# Patient Record
Sex: FEMALE | HISPANIC OR LATINO
[De-identification: names, ages, dates, MRNs, and addresses within clinical notes are randomized per-mention and may not be internally consistent; named-entity substitution may affect disease eponyms.]

---

## 2023-07-10 ENCOUNTER — APPOINTMENT (OUTPATIENT)
Dept: FAMILY MEDICINE | Facility: CLINIC | Age: 73
End: 2023-07-10
Payer: MEDICARE

## 2023-07-10 ENCOUNTER — NON-APPOINTMENT (OUTPATIENT)
Age: 73
End: 2023-07-10

## 2023-07-10 VITALS
BODY MASS INDEX: 24.94 KG/M2 | RESPIRATION RATE: 16 BRPM | DIASTOLIC BLOOD PRESSURE: 70 MMHG | WEIGHT: 127 LBS | HEART RATE: 62 BPM | SYSTOLIC BLOOD PRESSURE: 138 MMHG | HEIGHT: 60 IN

## 2023-07-10 DIAGNOSIS — Z12.11 ENCOUNTER FOR SCREENING FOR MALIGNANT NEOPLASM OF COLON: ICD-10-CM

## 2023-07-10 DIAGNOSIS — Z12.31 ENCOUNTER FOR SCREENING MAMMOGRAM FOR MALIGNANT NEOPLASM OF BREAST: ICD-10-CM

## 2023-07-10 DIAGNOSIS — Z11.4 ENCOUNTER FOR SCREENING FOR HUMAN IMMUNODEFICIENCY VIRUS [HIV]: ICD-10-CM

## 2023-07-10 DIAGNOSIS — F41.9 ANXIETY DISORDER, UNSPECIFIED: ICD-10-CM

## 2023-07-10 DIAGNOSIS — F32.A ANXIETY DISORDER, UNSPECIFIED: ICD-10-CM

## 2023-07-10 DIAGNOSIS — Z11.59 ENCOUNTER FOR SCREENING FOR OTHER VIRAL DISEASES: ICD-10-CM

## 2023-07-10 PROCEDURE — 99204 OFFICE O/P NEW MOD 45 MIN: CPT | Mod: 25

## 2023-07-10 PROCEDURE — 36415 COLL VENOUS BLD VENIPUNCTURE: CPT

## 2023-07-10 RX ORDER — BISOPROLOL FUMARATE 5 MG/1
TABLET, FILM COATED ORAL
Refills: 0 | Status: DISCONTINUED | COMMUNITY
End: 2023-07-10

## 2023-07-10 RX ORDER — BISOPROLOL FUMARATE 5 MG/1
5 TABLET, FILM COATED ORAL
Qty: 45 | Refills: 1 | Status: ACTIVE | COMMUNITY
Start: 2023-07-10 | End: 1900-01-01

## 2023-07-10 RX ORDER — ESCITALOPRAM OXALATE 10 MG/1
10 TABLET ORAL
Refills: 0 | Status: DISCONTINUED | COMMUNITY
End: 2023-07-10

## 2023-07-10 NOTE — PLAN
[FreeTextEntry1] : \par Anxiety, depression\par Feels well off Escitalopram now, will continue to hold\par Not interested in therapist at this time\par \par In regards to hypertension\par Patient's blood pressure in adequate range. \par Continue Bisoprolol 2.5 mg  QD (1/2 of 5 mg pills QD)\par DIscussed avoid using salt, monitoring his blood pressures at home and bring the log on next visit.\par Advised to seek medical help emergently if systolic blood pressures (bottom number) is > 180 or < 100 or diastolic blood pressure (bottom number) is >100 or <60 or if patient has  symptoms that include but are not limited to constant headaches, chest pain, palpitations, dizziness, lightheadedness.\par \par Ordering preventive lab work\par Referring to GI for colonoscopy per patient's request\par \par Return to care: within 1 month for AWE or sooner should the need arise\par Call or return for any questions \par

## 2023-07-10 NOTE — HEALTH RISK ASSESSMENT
[No] : In the past 12 months have you used drugs other than those required for medical reasons? No [0] : 2) Feeling down, depressed, or hopeless: Not at all (0) [PHQ-2 Negative - No further assessment needed] : PHQ-2 Negative - No further assessment needed [HIV Test offered] : HIV Test offered [Hepatitis C test offered] : Hepatitis C test offered [Fully functional (bathing, dressing, toileting, transferring, walking, feeding)] : Fully functional (bathing, dressing, toileting, transferring, walking, feeding) [Fully functional (using the telephone, shopping, preparing meals, housekeeping, doing laundry, using] : Fully functional and needs no help or supervision to perform IADLs (using the telephone, shopping, preparing meals, housekeeping, doing laundry, using transportation, managing medications and managing finances) [Never] : Never [[id number]] : 0

## 2023-07-10 NOTE — HISTORY OF PRESENT ILLNESS
[FreeTextEntry1] : Establish care [de-identified] : Ms. JARAD FAROOQ is a pleasant 72 year old female with PMH of anxiety/depression, HTN who comes in to the office accompanied by her granddaughter Elzbieta to establish care. She has been out of her citalopram for about 1 month. She feels mildly anxious but no depression, suicidal thoughts. No other complaints \par Her   2 years ago due to COVID-19.\par \par Previous PCP:\par

## 2023-07-12 LAB
ALBUMIN SERPL ELPH-MCNC: 4.1 G/DL
ALP BLD-CCNC: 136 U/L
ALT SERPL-CCNC: 17 U/L
ANION GAP SERPL CALC-SCNC: 11 MMOL/L
AST SERPL-CCNC: 17 U/L
BILIRUB SERPL-MCNC: 0.4 MG/DL
BUN SERPL-MCNC: 20 MG/DL
CALCIUM SERPL-MCNC: 9.5 MG/DL
CHLORIDE SERPL-SCNC: 102 MMOL/L
CO2 SERPL-SCNC: 25 MMOL/L
CREAT SERPL-MCNC: 0.56 MG/DL
EGFR: 96 ML/MIN/1.73M2
GLUCOSE SERPL-MCNC: 94 MG/DL
HCV AB SER QL: NONREACTIVE
HCV S/CO RATIO: 0.24 S/CO
HIV1+2 AB SPEC QL IA.RAPID: NONREACTIVE
POTASSIUM SERPL-SCNC: 4.6 MMOL/L
PROT SERPL-MCNC: 7.8 G/DL
SODIUM SERPL-SCNC: 138 MMOL/L

## 2023-07-13 ENCOUNTER — NON-APPOINTMENT (OUTPATIENT)
Age: 73
End: 2023-07-13

## 2023-07-14 RX ORDER — POLYETHYLENE GLYCOL-3350 AND ELECTROLYTES WITH FLAVOR PACK 240; 5.84; 2.98; 6.72; 22.72 G/278.26G; G/278.26G; G/278.26G; G/278.26G; G/278.26G
240 POWDER, FOR SOLUTION ORAL
Qty: 1 | Refills: 0 | Status: ACTIVE | COMMUNITY
Start: 2023-07-14 | End: 1900-01-01

## 2023-07-25 ENCOUNTER — RESULT REVIEW (OUTPATIENT)
Age: 73
End: 2023-07-25

## 2023-07-25 ENCOUNTER — OUTPATIENT (OUTPATIENT)
Dept: OUTPATIENT SERVICES | Facility: HOSPITAL | Age: 73
LOS: 1 days | End: 2023-07-25
Payer: MEDICARE

## 2023-07-25 ENCOUNTER — APPOINTMENT (OUTPATIENT)
Dept: MAMMOGRAPHY | Facility: CLINIC | Age: 73
End: 2023-07-25
Payer: MEDICARE

## 2023-07-25 DIAGNOSIS — Z12.31 ENCOUNTER FOR SCREENING MAMMOGRAM FOR MALIGNANT NEOPLASM OF BREAST: ICD-10-CM

## 2023-07-25 PROCEDURE — 77063 BREAST TOMOSYNTHESIS BI: CPT | Mod: 26

## 2023-07-25 PROCEDURE — 77067 SCR MAMMO BI INCL CAD: CPT | Mod: 26

## 2023-07-25 PROCEDURE — 77063 BREAST TOMOSYNTHESIS BI: CPT

## 2023-07-25 PROCEDURE — 77067 SCR MAMMO BI INCL CAD: CPT

## 2023-08-10 ENCOUNTER — APPOINTMENT (OUTPATIENT)
Dept: GASTROENTEROLOGY | Facility: CLINIC | Age: 73
End: 2023-08-10

## 2023-08-14 ENCOUNTER — APPOINTMENT (OUTPATIENT)
Dept: FAMILY MEDICINE | Facility: CLINIC | Age: 73
End: 2023-08-14

## 2023-08-21 ENCOUNTER — NON-APPOINTMENT (OUTPATIENT)
Age: 73
End: 2023-08-21

## 2023-08-21 ENCOUNTER — APPOINTMENT (OUTPATIENT)
Dept: FAMILY MEDICINE | Facility: CLINIC | Age: 73
End: 2023-08-21
Payer: MEDICARE

## 2023-08-21 VITALS
WEIGHT: 127 LBS | BODY MASS INDEX: 24.94 KG/M2 | SYSTOLIC BLOOD PRESSURE: 105 MMHG | HEIGHT: 60 IN | DIASTOLIC BLOOD PRESSURE: 60 MMHG

## 2023-08-21 DIAGNOSIS — Z23 ENCOUNTER FOR IMMUNIZATION: ICD-10-CM

## 2023-08-21 DIAGNOSIS — I10 ESSENTIAL (PRIMARY) HYPERTENSION: ICD-10-CM

## 2023-08-21 DIAGNOSIS — R74.8 ABNORMAL LEVELS OF OTHER SERUM ENZYMES: ICD-10-CM

## 2023-08-21 DIAGNOSIS — Z00.00 ENCOUNTER FOR GENERAL ADULT MEDICAL EXAMINATION W/OUT ABNORMAL FINDINGS: ICD-10-CM

## 2023-08-21 PROCEDURE — 36415 COLL VENOUS BLD VENIPUNCTURE: CPT

## 2023-08-21 PROCEDURE — 90471 IMMUNIZATION ADMIN: CPT

## 2023-08-21 PROCEDURE — 90677 PCV20 VACCINE IM: CPT

## 2023-08-21 PROCEDURE — 99213 OFFICE O/P EST LOW 20 MIN: CPT | Mod: 25

## 2023-08-21 PROCEDURE — 90715 TDAP VACCINE 7 YRS/> IM: CPT

## 2023-08-21 PROCEDURE — G0439: CPT

## 2023-08-21 PROCEDURE — 93000 ELECTROCARDIOGRAM COMPLETE: CPT

## 2023-08-21 PROCEDURE — G0009: CPT | Mod: 59

## 2023-08-21 NOTE — PLAN
[FreeTextEntry1] :  In regards annual physical exam:  Advised to use sunscreen  Prevnar and Tdap vaccine given, tolerated well Reviewed CBC, CMP, Hep C, HIV as per screening guidelines Depression screen: PHQ-2 test done, < 2 as noted above AUDIT-C test done, negative as noted above Advised to see optometrist once at year and dentist every 6 months Osteoporosis screen: DEXA ordered Recent mammogram [id number]  Elevated alk phosp Repeat and do GGT  Anxiety, depression Feels well off Escitalopram now, will continue to hold Not interested in therapist at this time  In regards to hypertension Patient's blood pressure in adequate range.  Continue Bisoprolol 2.5 mg  QD (1/2 of 5 mg pills QD) Avoid salt, monitoring his blood pressures at home and bring the log on next visit. Advised to seek medical help emergently if systolic blood pressures (bottom number) is > 180 or < 100 or diastolic blood pressure (bottom number) is >100 or <60 or if patient has  symptoms that include but are not limited to constant headaches, chest pain, palpitations, dizziness, lightheadedness.  Abnormal EKG Asymptmatic Referring to cardiology  Will have colonoscopy next month  Return to care: within 3 months for follow up or sooner should the need arise Call or return for any questions

## 2023-08-21 NOTE — HISTORY OF PRESENT ILLNESS
[FreeTextEntry1] : AWE [de-identified] : Ms. JARAD FAROOQ is a pleasant 72 year old female with PMH of anxiety/depression, HTN who comes in to the office for her AWE. No other complaints   Previous PCP:

## 2023-08-21 NOTE — HEALTH RISK ASSESSMENT
[Yes] : Yes [Monthly or less (1 pt)] : Monthly or less (1 point) [1 or 2 (0 pts)] : 1 or 2 (0 points) [Never (0 pts)] : Never (0 points) [No] : In the past 12 months have you used drugs other than those required for medical reasons? No [No falls in past year] : Patient reported no falls in the past year [0] : 2) Feeling down, depressed, or hopeless: Not at all (0) [PHQ-2 Negative - No further assessment needed] : PHQ-2 Negative - No further assessment needed [Fully functional (bathing, dressing, toileting, transferring, walking, feeding)] : Fully functional (bathing, dressing, toileting, transferring, walking, feeding) [Fully functional (using the telephone, shopping, preparing meals, housekeeping, doing laundry, using] : Fully functional and needs no help or supervision to perform IADLs (using the telephone, shopping, preparing meals, housekeeping, doing laundry, using transportation, managing medications and managing finances) [Never] : Never [Audit-CScore] : 1 [[id number]] : 0

## 2023-08-22 LAB
ALP BLD-CCNC: 131 U/L
GGT SERPL-CCNC: 11 U/L

## 2023-08-30 ENCOUNTER — APPOINTMENT (OUTPATIENT)
Dept: INTERNAL MEDICINE | Facility: CLINIC | Age: 73
End: 2023-08-30

## 2023-09-01 ENCOUNTER — APPOINTMENT (OUTPATIENT)
Dept: CARDIOLOGY | Facility: CLINIC | Age: 73
End: 2023-09-01
Payer: MEDICARE

## 2023-09-01 VITALS
BODY MASS INDEX: 24.74 KG/M2 | SYSTOLIC BLOOD PRESSURE: 138 MMHG | WEIGHT: 126 LBS | HEART RATE: 65 BPM | RESPIRATION RATE: 16 BRPM | HEIGHT: 60 IN | DIASTOLIC BLOOD PRESSURE: 76 MMHG

## 2023-09-01 DIAGNOSIS — R94.31 ABNORMAL ELECTROCARDIOGRAM [ECG] [EKG]: ICD-10-CM

## 2023-09-01 PROCEDURE — 99204 OFFICE O/P NEW MOD 45 MIN: CPT | Mod: 25

## 2023-09-01 PROCEDURE — 93000 ELECTROCARDIOGRAM COMPLETE: CPT

## 2023-09-01 NOTE — HISTORY OF PRESENT ILLNESS
[FreeTextEntry1] : 72F with hx of HTN, comes to the office referred by PMD for evaluation of an abnormal EKG done as part of an annual exam. Patient reports that for the past few months she has experienced left sided chest pain, Intermittent, sometimes exertional, lasting for few mins and alleviated by rest and the use of pain killers.  Patient denies GAY, no PND, no orthopnea, no leg edema, no claudication, no syncope.

## 2023-09-01 NOTE — ASSESSMENT
[FreeTextEntry1] : 72F with hx of HTN, comes to the office referred by PMD for evaluation of an abnormal EKG done as part of an annual exam. Patient reports that for the past few months she has experienced left sided chest pain, Intermittent, sometimes exertional, lasting for few mins and alleviated by rest and the use of pain killers.  Patient denies GAY, no PND, no orthopnea, no leg edema, no claudication, no syncope.  #Atypical chest pain  intermediate ASCVD risk will obtain CCTA and echocardiogram.  #HTN  BP at goal  continue current meds.   RTC post testing  I appreciate the opportunity of working with you in the care of JARAD FAROOQ . If I may be of additional assistance, please do not hesitate to contact me.

## 2023-09-12 ENCOUNTER — APPOINTMENT (OUTPATIENT)
Dept: GASTROENTEROLOGY | Facility: GI CENTER | Age: 73
End: 2023-09-12

## 2023-10-09 ENCOUNTER — APPOINTMENT (OUTPATIENT)
Dept: CARDIOLOGY | Facility: CLINIC | Age: 73
End: 2023-10-09
Payer: MEDICARE

## 2023-10-09 PROCEDURE — 93306 TTE W/DOPPLER COMPLETE: CPT

## 2023-10-19 ENCOUNTER — OUTPATIENT (OUTPATIENT)
Dept: OUTPATIENT SERVICES | Facility: HOSPITAL | Age: 73
LOS: 1 days | End: 2023-10-19
Payer: MEDICARE

## 2023-10-19 ENCOUNTER — APPOINTMENT (OUTPATIENT)
Dept: CT IMAGING | Facility: CLINIC | Age: 73
End: 2023-10-19
Payer: MEDICARE

## 2023-10-19 DIAGNOSIS — R07.89 OTHER CHEST PAIN: ICD-10-CM

## 2023-10-19 PROCEDURE — 75574 CT ANGIO HRT W/3D IMAGE: CPT | Mod: 26

## 2023-10-19 PROCEDURE — 75574 CT ANGIO HRT W/3D IMAGE: CPT

## 2023-10-24 ENCOUNTER — APPOINTMENT (OUTPATIENT)
Dept: CARDIOLOGY | Facility: CLINIC | Age: 73
End: 2023-10-24
Payer: MEDICARE

## 2023-10-24 VITALS
HEART RATE: 58 BPM | SYSTOLIC BLOOD PRESSURE: 150 MMHG | RESPIRATION RATE: 15 BRPM | BODY MASS INDEX: 24.74 KG/M2 | WEIGHT: 126 LBS | DIASTOLIC BLOOD PRESSURE: 80 MMHG | HEIGHT: 60 IN | OXYGEN SATURATION: 98 %

## 2023-10-24 DIAGNOSIS — R07.89 OTHER CHEST PAIN: ICD-10-CM

## 2023-10-24 DIAGNOSIS — I25.10 ATHEROSCLEROTIC HEART DISEASE OF NATIVE CORONARY ARTERY W/OUT ANGINA PECTORIS: ICD-10-CM

## 2023-10-24 DIAGNOSIS — I50.30 UNSPECIFIED DIASTOLIC (CONGESTIVE) HEART FAILURE: ICD-10-CM

## 2023-10-24 DIAGNOSIS — Z01.810 ENCOUNTER FOR PREPROCEDURAL CARDIOVASCULAR EXAMINATION: ICD-10-CM

## 2023-10-24 PROCEDURE — 99214 OFFICE O/P EST MOD 30 MIN: CPT

## 2023-10-24 RX ORDER — ATORVASTATIN CALCIUM 40 MG/1
40 TABLET, FILM COATED ORAL
Qty: 30 | Refills: 5 | Status: ACTIVE | COMMUNITY
Start: 2023-10-24 | End: 1900-01-01

## 2024-03-07 ENCOUNTER — APPOINTMENT (OUTPATIENT)
Dept: CARDIOLOGY | Facility: CLINIC | Age: 74
End: 2024-03-07

## 2025-01-27 ENCOUNTER — RX ONLY (RX ONLY)
Age: 75
End: 2025-01-27

## 2025-01-27 ENCOUNTER — OFFICE (OUTPATIENT)
Dept: URBAN - METROPOLITAN AREA CLINIC 115 | Facility: CLINIC | Age: 75
Setting detail: OPHTHALMOLOGY
End: 2025-01-27
Payer: MEDICARE

## 2025-01-27 DIAGNOSIS — H25.9: ICD-10-CM

## 2025-01-27 DIAGNOSIS — H40.1131: ICD-10-CM

## 2025-01-27 PROCEDURE — 92004 COMPRE OPH EXAM NEW PT 1/>: CPT | Performed by: OPHTHALMOLOGY

## 2025-01-27 PROCEDURE — 92133 CPTRZD OPH DX IMG PST SGM ON: CPT | Performed by: OPHTHALMOLOGY

## 2025-01-27 PROCEDURE — 76514 ECHO EXAM OF EYE THICKNESS: CPT | Performed by: OPHTHALMOLOGY

## 2025-01-27 ASSESSMENT — REFRACTION_CURRENTRX
OS_ADD: +2.50
OD_ADD: +2.50
OD_OVR_VA: 20/
OD_SPHERE: +3.75
OS_SPHERE: +3.75
OS_OVR_VA: 20/
OS_CYLINDER: -0.25
OD_AXIS: 93
OS_AXIS: 159
OD_CYLINDER: -0.50

## 2025-01-27 ASSESSMENT — CONFRONTATIONAL VISUAL FIELD TEST (CVF)
OD_FINDINGS: FULL
OS_FINDINGS: FULL

## 2025-01-27 ASSESSMENT — REFRACTION_AUTOREFRACTION
OD_CYLINDER: -1.00
OS_CYLINDER: -1.00
OD_AXIS: 095
OS_SPHERE: +3.75
OS_AXIS: 091
OD_SPHERE: +4.25

## 2025-01-27 ASSESSMENT — VISUAL ACUITY
OS_BCVA: 20/30-1
OD_BCVA: 20/40-1

## 2025-01-27 ASSESSMENT — PACHYMETRY
OS_CT_CORRECTION: -1
OD_CT_CORRECTION: -2
OD_CT_UM: 570
OS_CT_UM: 563

## 2025-03-10 ENCOUNTER — OFFICE (OUTPATIENT)
Dept: URBAN - METROPOLITAN AREA CLINIC 115 | Facility: CLINIC | Age: 75
Setting detail: OPHTHALMOLOGY
End: 2025-03-10
Payer: MEDICARE

## 2025-03-10 DIAGNOSIS — H40.1131: ICD-10-CM

## 2025-03-10 DIAGNOSIS — H25.9: ICD-10-CM

## 2025-03-10 PROCEDURE — 99212 OFFICE O/P EST SF 10 MIN: CPT | Performed by: OPHTHALMOLOGY

## 2025-03-10 PROCEDURE — 92083 EXTENDED VISUAL FIELD XM: CPT | Performed by: OPHTHALMOLOGY

## 2025-03-10 PROCEDURE — 92250 FUNDUS PHOTOGRAPHY W/I&R: CPT | Performed by: OPHTHALMOLOGY

## 2025-03-10 ASSESSMENT — REFRACTION_AUTOREFRACTION
OD_AXIS: 089
OD_SPHERE: +4.25
OS_CYLINDER: -1.00
OS_SPHERE: +3.50
OS_AXIS: 103
OD_CYLINDER: -1.25

## 2025-03-10 ASSESSMENT — REFRACTION_CURRENTRX
OS_ADD: +2.50
OS_CYLINDER: -0.25
OS_AXIS: 159
OD_AXIS: 93
OS_OVR_VA: 20/
OS_SPHERE: +3.75
OD_ADD: +2.50
OD_CYLINDER: -0.50
OD_SPHERE: +3.75
OD_OVR_VA: 20/

## 2025-03-10 ASSESSMENT — PACHYMETRY
OD_CT_UM: 570
OS_CT_UM: 563
OD_CT_CORRECTION: -2
OS_CT_CORRECTION: -1

## 2025-03-10 ASSESSMENT — CONFRONTATIONAL VISUAL FIELD TEST (CVF)
OD_FINDINGS: FULL
OS_FINDINGS: FULL

## 2025-03-10 ASSESSMENT — TONOMETRY
OD_IOP_MMHG: 18
OD_IOP_MMHG: 13
OS_IOP_MMHG: 18
OS_IOP_MMHG: 18

## 2025-03-10 ASSESSMENT — VISUAL ACUITY
OS_BCVA: 20/25-1
OD_BCVA: 20/30

## 2025-07-07 ENCOUNTER — OFFICE (OUTPATIENT)
Dept: URBAN - METROPOLITAN AREA CLINIC 115 | Facility: CLINIC | Age: 75
Setting detail: OPHTHALMOLOGY
End: 2025-07-07
Payer: MEDICARE

## 2025-07-07 DIAGNOSIS — H40.1131: ICD-10-CM

## 2025-07-07 DIAGNOSIS — H25.9: ICD-10-CM

## 2025-07-07 PROCEDURE — 92020 GONIOSCOPY: CPT | Performed by: OPHTHALMOLOGY

## 2025-07-07 PROCEDURE — 99213 OFFICE O/P EST LOW 20 MIN: CPT | Performed by: OPHTHALMOLOGY

## 2025-07-07 ASSESSMENT — TONOMETRY
OD_IOP_MMHG: 20
OS_IOP_MMHG: 12
OD_IOP_MMHG: 16
OS_IOP_MMHG: 20

## 2025-07-07 ASSESSMENT — REFRACTION_CURRENTRX
OD_OVR_VA: 20/
OD_SPHERE: +3.75
OS_ADD: +2.50
OS_SPHERE: +3.75
OD_CYLINDER: -0.50
OS_AXIS: 159
OS_CYLINDER: -0.25
OD_AXIS: 93
OS_OVR_VA: 20/
OD_ADD: +2.50

## 2025-07-07 ASSESSMENT — REFRACTION_AUTOREFRACTION
OD_SPHERE: +4.00
OS_SPHERE: +3.75
OD_AXIS: 083
OS_AXIS: 079
OS_CYLINDER: -1.25
OD_CYLINDER: --1.00

## 2025-07-07 ASSESSMENT — CONFRONTATIONAL VISUAL FIELD TEST (CVF)
OS_FINDINGS: FULL
OD_FINDINGS: FULL

## 2025-07-07 ASSESSMENT — PACHYMETRY
OS_CT_CORRECTION: -1
OD_CT_CORRECTION: -2
OS_CT_UM: 563
OD_CT_UM: 570

## 2025-07-07 ASSESSMENT — VISUAL ACUITY
OD_BCVA: 20/25-
OS_BCVA: 20/25-2